# Patient Record
Sex: MALE | Race: BLACK OR AFRICAN AMERICAN | NOT HISPANIC OR LATINO | URBAN - METROPOLITAN AREA
[De-identification: names, ages, dates, MRNs, and addresses within clinical notes are randomized per-mention and may not be internally consistent; named-entity substitution may affect disease eponyms.]

---

## 2023-11-28 ENCOUNTER — HOSPITAL ENCOUNTER (EMERGENCY)
Facility: HOSPITAL | Age: 43
Discharge: HOME | End: 2023-11-29
Attending: STUDENT IN AN ORGANIZED HEALTH CARE EDUCATION/TRAINING PROGRAM
Payer: MEDICAID

## 2023-11-28 DIAGNOSIS — M71.20 SYNOVIAL CYST OF KNEE, UNSPECIFIED LATERALITY: Primary | ICD-10-CM

## 2023-11-28 LAB
ALBUMIN SERPL BCP-MCNC: 4.5 G/DL (ref 3.4–5)
ALP SERPL-CCNC: 77 U/L (ref 33–120)
ALT SERPL W P-5'-P-CCNC: 13 U/L (ref 10–52)
ANION GAP SERPL CALC-SCNC: 13 MMOL/L (ref 10–20)
APTT PPP: 34 SECONDS (ref 27–38)
AST SERPL W P-5'-P-CCNC: 15 U/L (ref 9–39)
BILIRUB SERPL-MCNC: 0.5 MG/DL (ref 0–1.2)
BUN SERPL-MCNC: 7 MG/DL (ref 6–23)
CALCIUM SERPL-MCNC: 9.9 MG/DL (ref 8.6–10.6)
CHLORIDE SERPL-SCNC: 101 MMOL/L (ref 98–107)
CO2 SERPL-SCNC: 28 MMOL/L (ref 21–32)
CREAT SERPL-MCNC: 0.96 MG/DL (ref 0.5–1.3)
GFR SERPL CREATININE-BSD FRML MDRD: >90 ML/MIN/1.73M*2
GLUCOSE SERPL-MCNC: 93 MG/DL (ref 74–99)
INR PPP: 1.1 (ref 0.9–1.1)
POTASSIUM SERPL-SCNC: 3.4 MMOL/L (ref 3.5–5.3)
PROT SERPL-MCNC: 7.9 G/DL (ref 6.4–8.2)
PROTHROMBIN TIME: 12 SECONDS (ref 9.8–12.8)
SODIUM SERPL-SCNC: 139 MMOL/L (ref 136–145)

## 2023-11-28 PROCEDURE — 36415 COLL VENOUS BLD VENIPUNCTURE: CPT | Performed by: EMERGENCY MEDICINE

## 2023-11-28 PROCEDURE — 80053 COMPREHEN METABOLIC PANEL: CPT | Performed by: STUDENT IN AN ORGANIZED HEALTH CARE EDUCATION/TRAINING PROGRAM

## 2023-11-28 PROCEDURE — 99284 EMERGENCY DEPT VISIT MOD MDM: CPT | Performed by: STUDENT IN AN ORGANIZED HEALTH CARE EDUCATION/TRAINING PROGRAM

## 2023-11-28 PROCEDURE — 85610 PROTHROMBIN TIME: CPT | Performed by: STUDENT IN AN ORGANIZED HEALTH CARE EDUCATION/TRAINING PROGRAM

## 2023-11-28 PROCEDURE — 85730 THROMBOPLASTIN TIME PARTIAL: CPT | Performed by: STUDENT IN AN ORGANIZED HEALTH CARE EDUCATION/TRAINING PROGRAM

## 2023-11-28 PROCEDURE — 99284 EMERGENCY DEPT VISIT MOD MDM: CPT | Mod: 25 | Performed by: STUDENT IN AN ORGANIZED HEALTH CARE EDUCATION/TRAINING PROGRAM

## 2023-11-28 PROCEDURE — 85610 PROTHROMBIN TIME: CPT | Performed by: EMERGENCY MEDICINE

## 2023-11-28 PROCEDURE — 85730 THROMBOPLASTIN TIME PARTIAL: CPT | Performed by: EMERGENCY MEDICINE

## 2023-11-28 ASSESSMENT — COLUMBIA-SUICIDE SEVERITY RATING SCALE - C-SSRS
2. HAVE YOU ACTUALLY HAD ANY THOUGHTS OF KILLING YOURSELF?: NO
6. HAVE YOU EVER DONE ANYTHING, STARTED TO DO ANYTHING, OR PREPARED TO DO ANYTHING TO END YOUR LIFE?: NO
1. IN THE PAST MONTH, HAVE YOU WISHED YOU WERE DEAD OR WISHED YOU COULD GO TO SLEEP AND NOT WAKE UP?: NO

## 2023-11-28 ASSESSMENT — LIFESTYLE VARIABLES
REASON UNABLE TO ASSESS: NO
HAVE PEOPLE ANNOYED YOU BY CRITICIZING YOUR DRINKING: NO
EVER FELT BAD OR GUILTY ABOUT YOUR DRINKING: NO
HAVE YOU EVER FELT YOU SHOULD CUT DOWN ON YOUR DRINKING: NO
EVER HAD A DRINK FIRST THING IN THE MORNING TO STEADY YOUR NERVES TO GET RID OF A HANGOVER: NO

## 2023-11-28 NOTE — Clinical Note
Larry Purdy was seen and treated in our emergency department on 11/28/2023.  He may return to work on 11/30/2023.  Patient was seen at [facility] Emergency Department on 11/29/23. Please excuse from work on 11/29/23 and plan to return on 11/30/23.      If you have any questions or concerns, please don't hesitate to call.      Cassandra Shepherd MD

## 2023-11-29 ENCOUNTER — APPOINTMENT (OUTPATIENT)
Dept: RADIOLOGY | Facility: HOSPITAL | Age: 43
End: 2023-11-29
Payer: MEDICAID

## 2023-11-29 VITALS
HEART RATE: 76 BPM | TEMPERATURE: 96.3 F | DIASTOLIC BLOOD PRESSURE: 94 MMHG | RESPIRATION RATE: 14 BRPM | OXYGEN SATURATION: 96 % | SYSTOLIC BLOOD PRESSURE: 142 MMHG

## 2023-11-29 PROCEDURE — 2500000001 HC RX 250 WO HCPCS SELF ADMINISTERED DRUGS (ALT 637 FOR MEDICARE OP): Mod: SE | Performed by: STUDENT IN AN ORGANIZED HEALTH CARE EDUCATION/TRAINING PROGRAM

## 2023-11-29 PROCEDURE — 93970 EXTREMITY STUDY: CPT

## 2023-11-29 PROCEDURE — 73564 X-RAY EXAM KNEE 4 OR MORE: CPT | Mod: LT

## 2023-11-29 PROCEDURE — 93971 EXTREMITY STUDY: CPT | Performed by: RADIOLOGY

## 2023-11-29 PROCEDURE — 73564 X-RAY EXAM KNEE 4 OR MORE: CPT | Mod: RT

## 2023-11-29 PROCEDURE — 73564 X-RAY EXAM KNEE 4 OR MORE: CPT | Mod: LEFT SIDE | Performed by: RADIOLOGY

## 2023-11-29 RX ORDER — IBUPROFEN 600 MG/1
600 TABLET ORAL ONCE
Status: COMPLETED | OUTPATIENT
Start: 2023-11-29 | End: 2023-11-29

## 2023-11-29 RX ADMIN — IBUPROFEN 600 MG: 600 TABLET, FILM COATED ORAL at 02:00

## 2023-11-29 NOTE — DISCHARGE INSTRUCTIONS
You were seen at the  Emergency Department for knee swelling and pain. X-rays of the knees showed no acute fractures. Ultrasound showed fluid collection behind each knee (Baker's cyst). There was no evidence of a clot (DVT). It will be important to take Ibuprofen (NSAIDS) for pain relief over the counter and apply cold packs. Please follow up with Orthopedics for further care.     Please return to the ER if you experience any of the following symptoms  - Chest pain  - Shortness of breath  - Inability to bear weight  - Redness of joint with swelling/fever  - Any other concerning symptom

## 2023-11-29 NOTE — ED PROVIDER NOTES
HPI   Chief Complaint   Patient presents with    Leg Swelling     Treated for a LLE DVT 2 months ago. Here with RLE swelling       Larry Purdy is a 43 year old male with PMHx of DVT in left lower extremity now resolved on Eliquis, gout, hypertension, asthma who presents with bilateral knee swelling and pain behind the knees.  Patient reports that pain for started on the left side roughly 2 months ago in the knee which led to knee swelling.  He was eventually diagnosed with a left lower extremity DVT, and he continues to be on Eliquis.  Of note, left lower extremity DVT was not visualized on most recent duplex ultrasound, suggesting resolution.  Since then, patient continues to have pain in both knees, more so now on the right, with swelling in the front of both knees.  Pain is described as 8 out of 10 intensity, pulling sensation and is worse in the morning when getting out of bed and at the end of the day when he is done with work.  He feels pain with knee flexion, ambulation, and even when laying down with knee extended.  He has tried Bengay, Tylenol without significant relief.  Denies fevers, chills, cuts, bruises, rashes, sick contacts, significant recent travel, bowel or bladder issues, chest pain, shortness of breath.                          No data recorded                Patient History   No past medical history on file.  No past surgical history on file.  No family history on file.  Social History     Tobacco Use    Smoking status: Not on file    Smokeless tobacco: Not on file   Substance Use Topics    Alcohol use: Not on file    Drug use: Not on file       Physical Exam   ED Triage Vitals [11/28/23 2251]   Temp Heart Rate Resp BP   35.7 °C (96.3 °F) 80 20 151/83      SpO2 Temp Source Heart Rate Source Patient Position   92 % Skin -- --      BP Location FiO2 (%)     -- --       Physical Exam  Constitutional:       Appearance: Normal appearance.   HENT:      Head: Normocephalic and atraumatic.   Eyes:       Extraocular Movements: Extraocular movements intact.   Cardiovascular:      Rate and Rhythm: Normal rate and regular rhythm.   Pulmonary:      Effort: Pulmonary effort is normal.      Breath sounds: Normal breath sounds.   Abdominal:      General: Abdomen is flat. Bowel sounds are normal.      Palpations: Abdomen is soft.      Tenderness: There is no abdominal tenderness.   Musculoskeletal:      Comments: Knees: R knee with mild swelling anteriorly, non-erythematous and no warmth. ROM limited to 90 degrees flexion due to pain. L knee with mild swelling anteriorly, non-erythematous and no warmth, ROM limited to 90 degrees flexion due to pain.     No pain in calf bilaterally or in feet   Skin:     General: Skin is warm and dry.   Neurological:      Mental Status: He is alert and oriented to person, place, and time.   Psychiatric:         Mood and Affect: Mood normal.         ED Course & MDM   Diagnoses as of 11/29/23 0221   Synovial cyst of knee, unspecified laterality       Medical Decision Making  Given bilateral knee swelling without erythema or warmth, and worsening pain in the morning and with activity, etiology may be arthritic.  Alternative diagnoses such as gout or less likely given bilateral nature and lack of significant warmth and redness.  Septic arthritis is very unlikely given bilateral nature, lack of systemic signs and symptoms.  DVT may be possible given prior history of clot, but is less likely given that pain is bilateral and patient has no calf swelling or tenderness.  Given concern for arthritis, will obtain knee x-ray bilaterally.  Given prior history of DVT, will obtain bilateral duplex ultrasound to rule out DVT.  Patient given ibuprofen 600 mg x 1 for pain.  Imaging indicated bilateral popliteal cysts. No evidence of fracture or DVT. Patient was advised to take NSAIDS and cold pack for treatment and was discharged with recommended Orthopedic follow up.        Procedure  Procedures      Cassandra Shepherd MD  Resident  11/29/23 0056       Cassandra Shepherd MD  Resident  11/29/23 0222

## 2023-12-04 ENCOUNTER — TELEPHONE (OUTPATIENT)
Dept: PRIMARY CARE | Facility: CLINIC | Age: 43
End: 2023-12-04
Payer: MEDICAID

## 2023-12-04 NOTE — TELEPHONE ENCOUNTER
Called patient's phone number on file and it was not available.   Called alternate contact, parent of Larry on file who informed me that his phone had broken and they are in the process of replacing it.   She is aware of his situation and is concerned about leg.   Told patient's mother to placed call back the office phone number to book a follow up appointment with myself or his previous PCP on file.    Nicole Akhtar MD

## 2023-12-29 ENCOUNTER — OFFICE VISIT (OUTPATIENT)
Dept: PRIMARY CARE | Facility: CLINIC | Age: 43
End: 2023-12-29
Payer: MEDICAID

## 2023-12-29 VITALS
OXYGEN SATURATION: 97 % | WEIGHT: 230 LBS | TEMPERATURE: 97.9 F | SYSTOLIC BLOOD PRESSURE: 148 MMHG | BODY MASS INDEX: 28.6 KG/M2 | HEART RATE: 84 BPM | DIASTOLIC BLOOD PRESSURE: 86 MMHG | HEIGHT: 75 IN

## 2023-12-29 DIAGNOSIS — M25.562 PAIN IN BOTH KNEES, UNSPECIFIED CHRONICITY: ICD-10-CM

## 2023-12-29 DIAGNOSIS — Z13.1 DIABETES MELLITUS SCREENING: ICD-10-CM

## 2023-12-29 DIAGNOSIS — Z13.220 NEED FOR LIPID SCREENING: ICD-10-CM

## 2023-12-29 DIAGNOSIS — I10 BENIGN ESSENTIAL HTN: ICD-10-CM

## 2023-12-29 DIAGNOSIS — M71.20 SYNOVIAL CYST OF POPLITEAL SPACE, UNSPECIFIED LATERALITY: ICD-10-CM

## 2023-12-29 DIAGNOSIS — Z86.718 HISTORY OF DVT (DEEP VEIN THROMBOSIS): ICD-10-CM

## 2023-12-29 DIAGNOSIS — M25.561 PAIN IN BOTH KNEES, UNSPECIFIED CHRONICITY: ICD-10-CM

## 2023-12-29 DIAGNOSIS — Z76.89 ENCOUNTER TO ESTABLISH CARE WITH NEW DOCTOR: Primary | ICD-10-CM

## 2023-12-29 PROCEDURE — 99204 OFFICE O/P NEW MOD 45 MIN: CPT | Performed by: STUDENT IN AN ORGANIZED HEALTH CARE EDUCATION/TRAINING PROGRAM

## 2023-12-29 PROCEDURE — 3077F SYST BP >= 140 MM HG: CPT | Performed by: STUDENT IN AN ORGANIZED HEALTH CARE EDUCATION/TRAINING PROGRAM

## 2023-12-29 PROCEDURE — 3079F DIAST BP 80-89 MM HG: CPT | Performed by: STUDENT IN AN ORGANIZED HEALTH CARE EDUCATION/TRAINING PROGRAM

## 2023-12-29 RX ORDER — ALLOPURINOL 100 MG/1
100 TABLET ORAL DAILY
COMMUNITY
Start: 2022-03-16

## 2023-12-29 RX ORDER — APIXABAN 5 MG/1
5 TABLET, FILM COATED ORAL 2 TIMES DAILY
COMMUNITY

## 2023-12-29 RX ORDER — AMLODIPINE BESYLATE 10 MG/1
10 TABLET ORAL DAILY
COMMUNITY
Start: 2022-05-10

## 2023-12-29 NOTE — PROGRESS NOTES
"Subjective   Patient ID: Larry Purdy is a 43 y.o. male who presents for Follow-up (Hospital follow up, pain/swelling in knees).  HPI  Patient is here to follow on on his ER visit where he was diagnosed with bilateral knee joint effusions and mild OA, as well as bilateral popliteal cysts. States both knees have been bothering him. He had a history of first unprovoked DVT in August 2023. Has been on Eliquis since then. He was not told how long he should stay on Eliquis. He saw hematology at [facility] who recommended at least three months of anticoagulation but they did not determine the actual duration of anticoagulation. He denies any signs and symptoms of DVT today.    Of note, he has seen two different PCPs at [facility] within the past three months but does not seem to recall ever seeing them.    Denies new onset headaches, fever, chills, n/v/d, chest pain, SOB, abdominal pain, urinary symptoms, and lower extremity edema.     Review of Systems  All other systems have been reviewed and are negative.    Visit Vitals  /86   Pulse 84   Temp 36.6 °C (97.9 °F)   Ht 1.905 m (6' 3\")   Wt 104 kg (230 lb)   SpO2 97%   BMI 28.75 kg/m²   Smoking Status Every Day   BSA 2.35 m²       Objective   Physical Exam  General: Alert and oriented. Appears well-nourished and in no acute distress.  Eyes: PERRLA. EOMI.  Head/neck: Normocephalic. Supple.  Lymphatics: No cervical lymphadenopathy.  Respiratory/Thorax: Clear to auscultation bilaterally. No wheezing.   Cardiovascular: Regular rate and rhythm. No murmurs.  Gastrointestinal: Soft, nontender, nondistended. +BS   Musculoskeletal: ROM intact. No joint swelling. Normal strength. Bilateral cysts appreciated at the posterior side of both knees.  Extremities: Warm and well perfused. No peripheral edema.  Neurological: No gross neurologic deficits.   Psychological: Appropriate mood and affect.   Skin: No visible rashes or lesions.     Assessment/Plan     1. Encounter to establish care: "   -Will check labs (CBC, CMP, TSH, Lipid, A1C as patient is overweight)  -Will follow up with results and make medication adjustments, if warranted    2. HTN, uncontrolled: Not meeting BP goal of < 130/80.   Medication changes made today: none as patient did not take his Amlodipine 10mg every day prior to coming in  If BP is still elevated at the next visit, we will consider adding ACEi or ARB  Side effects of medications discussed with patient, as well as signs and symptoms of hypotension, including dizziness, syncope, and lightheadedness. Patient is to call the office if they experience any of the mentioned side effects and/or display symptoms of low blood pressure. Encouraged following a healthy lifestyle, watching salt in diet (less than 2g of sodium a day), avoiding soy sauce and processed meat, and limiting soda drinks, as well as exercising regularly. No smoking. Recommend checking blood pressure daily - same time of the day - and writing the numbers down. Advised to bring BP log to the next appointment.  F/U in 2-3 weeks for HTN follow up     3. Bilateral Spivey cyst: Advised patient to wear brace and explain it's usually managed conservatively but patient wishes to have an ortho referral. Ortho referral placed for further evaluation and management. Will follow up on recommendations.     4. History of DVT: Advised patient to follow up with his hematologist at [facility] for further evaluation and management. They will determine the duration of his Eliquis.    No red flags.     Problem List Items Addressed This Visit    None  Visit Diagnoses       Encounter to establish care with new doctor    -  Primary    Relevant Orders    CBC    Comprehensive Metabolic Panel    Need for lipid screening        Relevant Orders    Lipid Panel    Benign essential HTN        Pain in both knees, unspecified chronicity        Relevant Orders    Referral to Orthopaedic Surgery    History of DVT (deep vein thrombosis)        Diabetes  mellitus screening        Relevant Orders    Hemoglobin A1C            I have personally reviewed all available pertinent labs, imaging, and consult notes with the patient.     All questions and concerns were addressed. Patient verbalizes understanding instructions and agrees with established plan of care.     Mita Johnson MD, MS  Family Medicine  [facility]

## 2024-01-18 ENCOUNTER — OFFICE VISIT (OUTPATIENT)
Dept: ORTHOPEDIC SURGERY | Facility: HOSPITAL | Age: 44
End: 2024-01-18
Payer: MEDICAID

## 2024-01-18 VITALS — BODY MASS INDEX: 28.62 KG/M2 | WEIGHT: 235 LBS | HEIGHT: 76 IN

## 2024-01-18 DIAGNOSIS — M17.0 BILATERAL PRIMARY OSTEOARTHRITIS OF KNEE: Primary | ICD-10-CM

## 2024-01-18 DIAGNOSIS — M25.562 BILATERAL CHRONIC KNEE PAIN: ICD-10-CM

## 2024-01-18 DIAGNOSIS — M25.561 PAIN IN BOTH KNEES, UNSPECIFIED CHRONICITY: ICD-10-CM

## 2024-01-18 DIAGNOSIS — M25.561 BILATERAL CHRONIC KNEE PAIN: ICD-10-CM

## 2024-01-18 DIAGNOSIS — G89.29 BILATERAL CHRONIC KNEE PAIN: ICD-10-CM

## 2024-01-18 DIAGNOSIS — M25.562 PAIN IN BOTH KNEES, UNSPECIFIED CHRONICITY: ICD-10-CM

## 2024-01-18 PROCEDURE — 99213 OFFICE O/P EST LOW 20 MIN: CPT | Performed by: ORTHOPAEDIC SURGERY

## 2024-01-18 PROCEDURE — 20610 DRAIN/INJ JOINT/BURSA W/O US: CPT | Performed by: ORTHOPAEDIC SURGERY

## 2024-01-18 PROCEDURE — 2500000005 HC RX 250 GENERAL PHARMACY W/O HCPCS: Performed by: ORTHOPAEDIC SURGERY

## 2024-01-18 PROCEDURE — 99203 OFFICE O/P NEW LOW 30 MIN: CPT | Performed by: ORTHOPAEDIC SURGERY

## 2024-01-18 PROCEDURE — 2500000004 HC RX 250 GENERAL PHARMACY W/ HCPCS (ALT 636 FOR OP/ED): Performed by: ORTHOPAEDIC SURGERY

## 2024-01-18 RX ORDER — LIDOCAINE HYDROCHLORIDE 10 MG/ML
2 INJECTION INFILTRATION; PERINEURAL
Status: COMPLETED | OUTPATIENT
Start: 2024-01-18 | End: 2024-01-18

## 2024-01-18 RX ORDER — TRIAMCINOLONE ACETONIDE 40 MG/ML
40 INJECTION, SUSPENSION INTRA-ARTICULAR; INTRAMUSCULAR
Status: COMPLETED | OUTPATIENT
Start: 2024-01-18 | End: 2024-01-18

## 2024-01-18 RX ADMIN — LIDOCAINE HYDROCHLORIDE 2 ML: 10 INJECTION, SOLUTION INFILTRATION; PERINEURAL at 22:47

## 2024-01-18 RX ADMIN — TRIAMCINOLONE ACETONIDE 40 MG: 400 INJECTION, SUSPENSION INTRA-ARTICULAR; INTRAMUSCULAR at 22:47

## 2024-01-18 ASSESSMENT — PAIN SCALES - GENERAL: PAINLEVEL_OUTOF10: 7

## 2024-01-18 ASSESSMENT — PAIN DESCRIPTION - DESCRIPTORS: DESCRIPTORS: NAGGING;ACHING

## 2024-01-18 ASSESSMENT — PAIN - FUNCTIONAL ASSESSMENT: PAIN_FUNCTIONAL_ASSESSMENT: 0-10

## 2024-01-19 ENCOUNTER — APPOINTMENT (OUTPATIENT)
Dept: PRIMARY CARE | Facility: CLINIC | Age: 44
End: 2024-01-19
Payer: MEDICAID

## 2024-01-19 NOTE — PROGRESS NOTES
43-year-old is seen with bilateral knee pain.  He has been having persistent severe sharp shooting pain in both knees its worse with standing and walking.  He works for [employer] and does repetitive lifting.  He has a history of a left lower extremity DVT  and is on Eliquis.  He has been having knee pain since September.  He is for the most part continued working.  He is missed work this week due to his shoulder pain which required an emergency room visit.    Pleasant and no acute distress. Walks with a antalgic gait. Stands with varus alignment of both knees. Right knee range of motion is 5-110°. There is a mild effusion. The knee is stable to varus and valgus stress Lachman and posterior drawer. There is generalized tenderness. Left knee range of motion is 5-110°. There is a mild effusion. The knee is stable to varus and valgus stress Lachman and posterior drawer. There is generalized tenderness. Both lower extremities are well perfused the skin is intact and muscle tone is adequate.    Multiple x-ray views of the right knee and multiple x-ray views of the left knee are personally reviewed and there are moderate degenerative changes with medial compartment joint space narrowing and subchondral sclerosis and osteophyte formation.    A discussion about knee arthritis was done.  Treatment options were reviewed.  He will perform physical therapy.  He can use Tylenol.  Decision was made to proceed with cortisone injections.  He can follow-up for intermittent injections.  Viscosupplementation could also be done.    L Inj/Asp: bilateral knee on 1/18/2024 10:47 PM  Indications: pain  Details: 22 G needle, anterolateral approach  Medications (Right): 40 mg triamcinolone acetonide 40 mg/mL; 2 mL lidocaine 10 mg/mL (1 %)  Medications (Left): 40 mg triamcinolone acetonide 40 mg/mL; 2 mL lidocaine 10 mg/mL (1 %)  Procedure, treatment alternatives, risks and benefits explained, specific risks discussed. Consent was given by the  patient.